# Patient Record
Sex: MALE | Race: WHITE | Employment: OTHER | URBAN - METROPOLITAN AREA
[De-identification: names, ages, dates, MRNs, and addresses within clinical notes are randomized per-mention and may not be internally consistent; named-entity substitution may affect disease eponyms.]

---

## 2022-02-16 ENCOUNTER — TRANSCRIBE ORDER (OUTPATIENT)
Dept: SCHEDULING | Age: 72
End: 2022-02-16

## 2022-02-16 DIAGNOSIS — M85.89 OSTEOPENIA OF MULTIPLE SITES: Primary | ICD-10-CM

## 2022-04-12 ENCOUNTER — HOSPITAL ENCOUNTER (OUTPATIENT)
Dept: MAMMOGRAPHY | Age: 72
Discharge: HOME | End: 2022-04-12
Attending: INTERNAL MEDICINE
Payer: MEDICARE

## 2022-04-12 DIAGNOSIS — M85.89 OSTEOPENIA OF MULTIPLE SITES: ICD-10-CM

## 2022-04-12 PROCEDURE — 77080 DXA BONE DENSITY AXIAL: CPT

## 2022-05-13 ENCOUNTER — OFFICE VISIT (OUTPATIENT)
Dept: SURGERY | Age: 72
End: 2022-05-13
Payer: MEDICARE

## 2022-05-13 VITALS
TEMPERATURE: 97.6 F | BODY MASS INDEX: 26.18 KG/M2 | HEIGHT: 74 IN | DIASTOLIC BLOOD PRESSURE: 74 MMHG | HEART RATE: 60 BPM | RESPIRATION RATE: 17 BRPM | SYSTOLIC BLOOD PRESSURE: 133 MMHG | WEIGHT: 204 LBS | OXYGEN SATURATION: 99 %

## 2022-05-13 DIAGNOSIS — I47.1 SVT (SUPRAVENTRICULAR TACHYCARDIA) (HCC): ICD-10-CM

## 2022-05-13 DIAGNOSIS — Z01.810 PREOP CARDIOVASCULAR EXAM: Primary | ICD-10-CM

## 2022-05-13 DIAGNOSIS — K40.90 REDUCIBLE RIGHT INGUINAL HERNIA: ICD-10-CM

## 2022-05-13 PROCEDURE — 3017F COLORECTAL CA SCREEN DOC REV: CPT | Performed by: SURGERY

## 2022-05-13 PROCEDURE — G8536 NO DOC ELDER MAL SCRN: HCPCS | Performed by: SURGERY

## 2022-05-13 PROCEDURE — G8432 DEP SCR NOT DOC, RNG: HCPCS | Performed by: SURGERY

## 2022-05-13 PROCEDURE — G8427 DOCREV CUR MEDS BY ELIG CLIN: HCPCS | Performed by: SURGERY

## 2022-05-13 PROCEDURE — 99204 OFFICE O/P NEW MOD 45 MIN: CPT | Performed by: SURGERY

## 2022-05-13 PROCEDURE — G8419 CALC BMI OUT NRM PARAM NOF/U: HCPCS | Performed by: SURGERY

## 2022-05-13 PROCEDURE — 1101F PT FALLS ASSESS-DOCD LE1/YR: CPT | Performed by: SURGERY

## 2022-05-13 RX ORDER — ASPIRIN 81 MG/1
162 TABLET ORAL DAILY
COMMUNITY

## 2022-05-13 RX ORDER — ROSUVASTATIN CALCIUM 10 MG/1
10 TABLET, COATED ORAL DAILY
COMMUNITY
Start: 2022-02-14

## 2022-05-13 RX ORDER — ALENDRONATE SODIUM 40 MG/1
TABLET ORAL
COMMUNITY

## 2022-05-13 RX ORDER — OMEPRAZOLE 20 MG/1
20 CAPSULE, DELAYED RELEASE ORAL AS NEEDED
COMMUNITY

## 2022-05-13 RX ORDER — CHOLECALCIFEROL (VITAMIN D3) 125 MCG
4000 CAPSULE ORAL DAILY
COMMUNITY

## 2022-05-13 NOTE — LETTER
5/13/2022    Patient: Yulia Alfred   YOB: 1950   Date of Visit: 5/13/2022     Nick Staton MD  [address] [address]  Via Fax: [phone]    Dear Nick Staton MD,      Thank you for referring Mr. Yulia Alfred to [address] for evaluation. My notes for this consultation are attached. If you have questions, please do not hesitate to call me. I look forward to following your patient along with you.       Sincerely,    Ayesha Saunders, [address] [place] [address]

## 2022-05-13 NOTE — PROGRESS NOTES
CC:   Chief Complaint   Patient presents with    Inguinal Hernia        Assessment:  1. Preop cardiovascular exam    - SCHEDULE SURGERY  - EKG, 12 LEAD, INITIAL; Future  - CBC WITH AUTOMATED DIFF; Future  - METABOLIC PANEL, BASIC; Future    2. Reducible right inguinal hernia      3. SVT (supraventricular tachycardia) (HCC)         Plan: He has a large symptomatic reducible right inguinal hernia that he would like to repair at this time. Recommended open right inguinal hernia repair with mesh. The risks and benefits of the procedure were reviewed with the patient including infection, bleeding, need for repeat procedure, injury to surrounding structures, chronic pain and recurrent hernia. Post operative instructions and expectations were reviewed with the patient. The patient's questions were answered and consent was obtained. We will obtain preoperative EKG and routine labs. He states he was recently seen by his primary care who follows him for a history of his heart and states everything was good. We will need to obtain a clearance letter from his medical doctor. He agrees with this plan. HPI:  Cruz Cody is a 73 y.o. male who is referred by Veronica Woodson MD for right inguinal hernia. States that he has noticed the hernia for several months now and feels the area is getting larger. The hernia does pop out and will reduce back in with pressure. He will occasionally get a pinching sensation or retraction of the right testicle when the hernia comes out. No difficulty urinating or using his bowels. He does not do any heavy lifting. He does have a history of left open inguinal hernia repair in 2018 and reports no problems from that surgery that was performed in [place]. He does have a history of cardiac stents in 2009 and has had no cardiac issues since then.   He states he does have a long standing history of SVT and will have a brief episode of dizziness that will resolve with in 10 seconds after doing a maneuver such as cough or laying down. Allergies: Allergies   Allergen Reactions    Demerol [Meperidine] Nausea Only       Medication Review:  Current Outpatient Medications on File Prior to Visit   Medication Sig Dispense Refill    aspirin delayed-release 81 mg tablet Adult Low Dose Aspirin 81 mg tablet,delayed release   Take 2 tablets every day by oral route.  omeprazole (PRILOSEC) 20 mg capsule Take 20 mg by mouth daily.  rosuvastatin (CRESTOR) 10 mg tablet Take 10 mg by mouth daily.  cholecalciferol, vitamin D3, (Vitamin D3) 50 mcg (2,000 unit) tab Take 4,000 Units by mouth daily.  ubidecarenone (COQ-10 PO) Take  by mouth daily.  alendronate (FOSAMAX) 40 mg tablet Take  by mouth every seven (7) days. No current facility-administered medications on file prior to visit. Systems Review:  Review of Systems   Constitutional: Negative for activity change, fatigue and fever. Respiratory: Negative for chest tightness and shortness of breath. Cardiovascular: Negative for chest pain and palpitations. Gastrointestinal: Negative for abdominal distention and constipation. Genitourinary: Negative for scrotal swelling, testicular pain and urgency. Skin: Negative for pallor and rash. PMH:  Past Medical History:   Diagnosis Date    Arthritis     Inguinal hernia     right    Osteoporosis     Rheumatoid arthritis ([address]     in remission for 2 years    Shoulder pain, right        Surgical History:  Past Surgical History:   Procedure Laterality Date    HX HERNIA REPAIR Left 2018    [facility] CARD[facility] PROCEDURE UNLIST  2009    stents placed       Social History:  Social History     Socioeconomic History    Marital status:    Tobacco Use    Smoking status: Never Smoker    Smokeless tobacco: Never Used   Substance and Sexual Activity    Alcohol use: Yes    Drug use: Never       Family History:  History reviewed. No pertinent family history.     No results found for any previous visit. DEXA BONE DENSITY STUDY AXIAL  Narrative: DXA BONE DENSITOMETRY, CENTRAL    INDICATION:  Postmenopausal, screening. COMPARISON: None    TECHNIQUE: Using GE LUNAR Prodigy densitometer, bone density measurement was  performed in the lumbar spine in addition to the proximal left and right femurs. T-score refers to standard deviations above or below average compared to a  young adult of the same sex. Z-score refers to standard deviations above or  below average compared to a patient of the same sex, age, race and weight. VALIDITY:    Valid at the spine and left hip. RESULTS:    The bone mineral density of the L1-L4 vertebral bodies is 1.177 g/cm2. This  corresponds to a T-score of 3.1. The bone mineral density of the left femoral neck is 0.921 g/cm2. This  corresponds to a T-score of -0.8. FRAX CALCULATION 10-YEAR PROBABILITY OF FRACTURE:    Major osteoporotic compression fracture: 12.0 %  Hip fracture: 2.2 %    The [address] [place] [address] has recommended that treatment be  considered for those with a 10-year probability of:    Major osteoporotic compression fracture: >20%  Hip fracture: >3%  Patient preferences may indicate treatment for people with 10-year fracture  probabilities above or below these levels. Impression: Bone mineral density measures within normal limits. COMMENTS:  Based upon current ISCD guidelines, the patient's overall diagnostic criteria,  selected using WHO criteria in postmenopausal women and males aged 48 and above,  is selected based upon the lowest T-score from among the lumbar spine, femoral  neck or 33% radius (if measured).     WHO Definition of Osteoporosis and Osteopenia on DXA (specified for  post-menopausal  females):      Normal:                     T-Score at or above -1 SD    Osteopenia:              T-Score between -1 and -2.5 SD    Osteoporosis:           T-Score at or below -2.5 SD    The risk of fracture approximately doubles for each 1 SD decrease in T-score. It is important to consider other factors in assessing a patient's risk of  fracture, including age, risk of falling/injury, history of fragility fracture,  family history of osteoporosis, smoking, low weight. RECOMMENDATIONS:  National Osteoporosis Foundation (NOF) guidelines recommend initiating therapy  to reduce fracture risk in women with BMD: T-score below -2.5 SD or T-score  below -1.5 SD with other risk factors present, specifically verbal fracture  (clinical or asymptomatic) or hip fracture. * Mild to aggressive therapies are available in the form of hormone replacement  therapy (HRT), bisphosphonates, Calcitonin, and SERMs. Additionally, all  patients should insure an adequate intake of dietary calcium (1200 mg/d) and  vitamin D (400-800 IU daily). * People with diagnosed cases of osteoporosis or osteopenia should be regularly  tested for bone mineral density. For patient's eligible for Medicare, routine  testing is allowed once every 2 years. The testing frequently can be increased  to one year for patients who have rapidly progressing disease, or for those who  are receiving medical therapy to restore bone mass. Physical Exam:  Visit Vitals  /74   Pulse 60   Temp 97.6 °F (36.4 °C) (Temporal)   Resp 17   Ht 6' 1.5\" (1.867 m)   Wt 92.5 kg (204 lb)   SpO2 99%   BMI 26.55 kg/m²    BMI: Body mass index is 26.55 kg/m². Physical Exam  Constitutional:       Appearance: Normal appearance. He is normal weight. HENT:      Head: Normocephalic and atraumatic. Eyes:      Extraocular Movements: Extraocular movements intact. Conjunctiva/sclera: Conjunctivae normal.      Pupils: Pupils are equal, round, and reactive to light. Cardiovascular:      Rate and Rhythm: Normal rate and regular rhythm. Pulmonary:      Effort: Pulmonary effort is normal.      Breath sounds: Normal breath sounds.    Abdominal: General: Abdomen is flat. Bowel sounds are normal.      Palpations: Abdomen is soft. Hernia: A hernia is present. Comments: Large reducible right inguinal hernia, non tender, normal testicle, left inguinal incision well healed   Skin:     General: Skin is warm and dry. Neurological:      General: No focal deficit present. Mental Status: He is alert and oriented to person, place, and time. Psychiatric:         Mood and Affect: Mood normal.         Behavior: Behavior normal.         Thought Content: Thought content normal.         Judgment: Judgment normal.         I have reviewed the information entered by the clinical staff and/or patient and verified it as accurate or edited where necessary.      Electronically signed by:    Skyler Anderson DO, MPH

## 2022-05-13 NOTE — H&P (VIEW-ONLY)
CC:   Chief Complaint   Patient presents with    Inguinal Hernia        Assessment:  1. Preop cardiovascular exam    - SCHEDULE SURGERY  - EKG, 12 LEAD, INITIAL; Future  - CBC WITH AUTOMATED DIFF; Future  - METABOLIC PANEL, BASIC; Future    2. Reducible right inguinal hernia      3. SVT (supraventricular tachycardia) (HCC)         Plan: He has a large symptomatic reducible right inguinal hernia that he would like to repair at this time. Recommended open right inguinal hernia repair with mesh. The risks and benefits of the procedure were reviewed with the patient including infection, bleeding, need for repeat procedure, injury to surrounding structures, chronic pain and recurrent hernia. Post operative instructions and expectations were reviewed with the patient. The patient's questions were answered and consent was obtained. We will obtain preoperative EKG and routine labs. He states he was recently seen by his primary care who follows him for a history of his heart and states everything was good. We will need to obtain a clearance letter from his medical doctor. He agrees with this plan. HPI:  Ann Marie Wharton is a 73 y.o. male who is referred by Sheryl Lucero MD for right inguinal hernia. States that he has noticed the hernia for several months now and feels the area is getting larger. The hernia does pop out and will reduce back in with pressure. He will occasionally get a pinching sensation or retraction of the right testicle when the hernia comes out. No difficulty urinating or using his bowels. He does not do any heavy lifting. He does have a history of left open inguinal hernia repair in 2018 and reports no problems from that surgery that was performed in [place]. He does have a history of cardiac stents in 2009 and has had no cardiac issues since then.   He states he does have a long standing history of SVT and will have a brief episode of dizziness that will resolve with in 10 seconds after doing a maneuver such as cough or laying down. Allergies: Allergies   Allergen Reactions    Demerol [Meperidine] Nausea Only       Medication Review:  Current Outpatient Medications on File Prior to Visit   Medication Sig Dispense Refill    aspirin delayed-release 81 mg tablet Adult Low Dose Aspirin 81 mg tablet,delayed release   Take 2 tablets every day by oral route.  omeprazole (PRILOSEC) 20 mg capsule Take 20 mg by mouth daily.  rosuvastatin (CRESTOR) 10 mg tablet Take 10 mg by mouth daily.  cholecalciferol, vitamin D3, (Vitamin D3) 50 mcg (2,000 unit) tab Take 4,000 Units by mouth daily.  ubidecarenone (COQ-10 PO) Take  by mouth daily.  alendronate (FOSAMAX) 40 mg tablet Take  by mouth every seven (7) days. No current facility-administered medications on file prior to visit. Systems Review:  Review of Systems   Constitutional: Negative for activity change, fatigue and fever. Respiratory: Negative for chest tightness and shortness of breath. Cardiovascular: Negative for chest pain and palpitations. Gastrointestinal: Negative for abdominal distention and constipation. Genitourinary: Negative for scrotal swelling, testicular pain and urgency. Skin: Negative for pallor and rash. PMH:  Past Medical History:   Diagnosis Date    Arthritis     Inguinal hernia     right    Osteoporosis     Rheumatoid arthritis ([address]     in remission for 2 years    Shoulder pain, right        Surgical History:  Past Surgical History:   Procedure Laterality Date    HX HERNIA REPAIR Left 2018    [facility] CARDIAC SURG PROCEDURE UNLIST  2009    stents placed       Social History:  Social History     Socioeconomic History    Marital status:    Tobacco Use    Smoking status: Never Smoker    Smokeless tobacco: Never Used   Substance and Sexual Activity    Alcohol use: Yes    Drug use: Never       Family History:  History reviewed. No pertinent family history.     No results found for any previous visit. DEXA BONE DENSITY STUDY AXIAL  Narrative: DXA BONE DENSITOMETRY, CENTRAL    INDICATION:  Postmenopausal, screening. COMPARISON: None    TECHNIQUE: Using GE LUNAR Prodigy densitometer, bone density measurement was  performed in the lumbar spine in addition to the proximal left and right femurs. T-score refers to standard deviations above or below average compared to a  young adult of the same sex. Z-score refers to standard deviations above or  below average compared to a patient of the same sex, age, race and weight. VALIDITY:    Valid at the spine and left hip. RESULTS:    The bone mineral density of the L1-L4 vertebral bodies is 1.177 g/cm2. This  corresponds to a T-score of 3.1. The bone mineral density of the left femoral neck is 0.921 g/cm2. This  corresponds to a T-score of -0.8. FRAX CALCULATION 10-YEAR PROBABILITY OF FRACTURE:    Major osteoporotic compression fracture: 12.0 %  Hip fracture: 2.2 %    The [address] [place] [address] has recommended that treatment be  considered for those with a 10-year probability of:    Major osteoporotic compression fracture: >20%  Hip fracture: >3%  Patient preferences may indicate treatment for people with 10-year fracture  probabilities above or below these levels. Impression: Bone mineral density measures within normal limits. COMMENTS:  Based upon current ISCD guidelines, the patient's overall diagnostic criteria,  selected using WHO criteria in postmenopausal women and males aged 48 and above,  is selected based upon the lowest T-score from among the lumbar spine, femoral  neck or 33% radius (if measured).     WHO Definition of Osteoporosis and Osteopenia on DXA (specified for  post-menopausal  females):      Normal:                     T-Score at or above -1 SD    Osteopenia:              T-Score between -1 and -2.5 SD    Osteoporosis:           T-Score at or below -2.5 SD    The risk of fracture approximately doubles for each 1 SD decrease in T-score. It is important to consider other factors in assessing a patient's risk of  fracture, including age, risk of falling/injury, history of fragility fracture,  family history of osteoporosis, smoking, low weight. RECOMMENDATIONS:  National Osteoporosis Foundation (NOF) guidelines recommend initiating therapy  to reduce fracture risk in women with BMD: T-score below -2.5 SD or T-score  below -1.5 SD with other risk factors present, specifically verbal fracture  (clinical or asymptomatic) or hip fracture. * Mild to aggressive therapies are available in the form of hormone replacement  therapy (HRT), bisphosphonates, Calcitonin, and SERMs. Additionally, all  patients should insure an adequate intake of dietary calcium (1200 mg/d) and  vitamin D (400-800 IU daily). * People with diagnosed cases of osteoporosis or osteopenia should be regularly  tested for bone mineral density. For patient's eligible for Medicare, routine  testing is allowed once every 2 years. The testing frequently can be increased  to one year for patients who have rapidly progressing disease, or for those who  are receiving medical therapy to restore bone mass. Physical Exam:  Visit Vitals  /74   Pulse 60   Temp 97.6 °F (36.4 °C) (Temporal)   Resp 17   Ht 6' 1.5\" (1.867 m)   Wt 92.5 kg (204 lb)   SpO2 99%   BMI 26.55 kg/m²    BMI: Body mass index is 26.55 kg/m². Physical Exam  Constitutional:       Appearance: Normal appearance. He is normal weight. HENT:      Head: Normocephalic and atraumatic. Eyes:      Extraocular Movements: Extraocular movements intact. Conjunctiva/sclera: Conjunctivae normal.      Pupils: Pupils are equal, round, and reactive to light. Cardiovascular:      Rate and Rhythm: Normal rate and regular rhythm. Pulmonary:      Effort: Pulmonary effort is normal.      Breath sounds: Normal breath sounds.    Abdominal: General: Abdomen is flat. Bowel sounds are normal.      Palpations: Abdomen is soft. Hernia: A hernia is present. Comments: Large reducible right inguinal hernia, non tender, normal testicle, left inguinal incision well healed   Skin:     General: Skin is warm and dry. Neurological:      General: No focal deficit present. Mental Status: He is alert and oriented to person, place, and time. Psychiatric:         Mood and Affect: Mood normal.         Behavior: Behavior normal.         Thought Content: Thought content normal.         Judgment: Judgment normal.         I have reviewed the information entered by the clinical staff and/or patient and verified it as accurate or edited where necessary.      Electronically signed by:    Zulema Natarajan DO, MPH

## 2022-05-18 ENCOUNTER — HOSPITAL ENCOUNTER (OUTPATIENT)
Dept: LAB | Age: 72
Discharge: HOME | End: 2022-05-18
Payer: MEDICARE

## 2022-05-18 ENCOUNTER — HOSPITAL ENCOUNTER (OUTPATIENT)
Dept: NON INVASIVE DIAGNOSTICS | Age: 72
Discharge: HOME | End: 2022-05-18
Payer: MEDICARE

## 2022-05-18 DIAGNOSIS — Z01.810 PREOP CARDIOVASCULAR EXAM: ICD-10-CM

## 2022-05-18 LAB
ANION GAP SERPL CALC-SCNC: 4 MMOL/L (ref 3–18)
ATRIAL RATE: 55 BPM
BASOPHILS # BLD: 0 K/UL (ref 0–0.1)
BASOPHILS NFR BLD: 0 % (ref 0–2)
BUN SERPL-MCNC: 32 MG/DL (ref 7–18)
BUN/CREAT SERPL: 27 (ref 12–20)
CA-I BLD-MCNC: 9.3 MG/DL (ref 8.5–10.1)
CALCULATED P AXIS, ECG09: 59 DEGREES
CALCULATED R AXIS, ECG10: 76 DEGREES
CALCULATED T AXIS, ECG11: 53 DEGREES
CHLORIDE SERPL-SCNC: 103 MMOL/L (ref 100–111)
CO2 SERPL-SCNC: 31 MMOL/L (ref 21–32)
CREAT SERPL-MCNC: 1.19 MG/DL (ref 0.6–1.3)
DIAGNOSIS, 93000: NORMAL
DIFFERENTIAL METHOD BLD: NORMAL
EOSINOPHIL # BLD: 0.3 K/UL (ref 0–0.4)
EOSINOPHIL NFR BLD: 5 % (ref 0–5)
ERYTHROCYTE [DISTWIDTH] IN BLOOD BY AUTOMATED COUNT: 13 % (ref 11.6–14.5)
GLUCOSE SERPL-MCNC: 102 MG/DL (ref 74–99)
HCT VFR BLD AUTO: 44.7 % (ref 36–48)
HGB BLD-MCNC: 14.9 G/DL (ref 13–16)
IMM GRANULOCYTES # BLD AUTO: 0 K/UL (ref 0–0.04)
IMM GRANULOCYTES NFR BLD AUTO: 0 % (ref 0–0.5)
LYMPHOCYTES # BLD: 1.7 K/UL (ref 0.9–3.6)
LYMPHOCYTES NFR BLD: 25 % (ref 21–52)
MCH RBC QN AUTO: 31.2 PG (ref 24–34)
MCHC RBC AUTO-ENTMCNC: 33.3 G/DL (ref 31–37)
MCV RBC AUTO: 93.7 FL (ref 78–100)
MONOCYTES # BLD: 0.5 K/UL (ref 0.05–1.2)
MONOCYTES NFR BLD: 7 % (ref 3–10)
NEUTS SEG # BLD: 4.2 K/UL (ref 1.8–8)
NEUTS SEG NFR BLD: 63 % (ref 40–73)
NRBC # BLD: 0 K/UL (ref 0–0.01)
NRBC BLD-RTO: 0 PER 100 WBC
P-R INTERVAL, ECG05: 154 MS
PLATELET # BLD AUTO: 192 K/UL (ref 135–420)
PMV BLD AUTO: 10.6 FL (ref 9.2–11.8)
POTASSIUM SERPL-SCNC: 4 MMOL/L (ref 3.5–5.5)
Q-T INTERVAL, ECG07: 444 MS
QRS DURATION, ECG06: 88 MS
QTC CALCULATION (BEZET), ECG08: 424 MS
RBC # BLD AUTO: 4.77 M/UL (ref 4.35–5.65)
SODIUM SERPL-SCNC: 138 MMOL/L (ref 136–145)
VENTRICULAR RATE, ECG03: 55 BPM
WBC # BLD AUTO: 6.7 K/UL (ref 4.6–13.2)

## 2022-05-18 PROCEDURE — 36415 COLL VENOUS BLD VENIPUNCTURE: CPT

## 2022-05-18 PROCEDURE — 93005 ELECTROCARDIOGRAM TRACING: CPT

## 2022-05-18 PROCEDURE — 85025 COMPLETE CBC W/AUTO DIFF WBC: CPT

## 2022-05-18 PROCEDURE — 80048 BASIC METABOLIC PNL TOTAL CA: CPT

## 2022-06-02 NOTE — PERIOP NOTES
PRE-SURGICAL INSTRUCTIONS        Patient's Name:  Marcio Beckwith      [id number] Date:  6/2/2022            Covid Testing Date and Time:    Surgery Date:  6/7/2022                1. Do NOT eat or drink anything, including candy, gum, or ice chips after midnight on 06/06/2022, unless you have specific instructions from your surgeon or anesthesia provider to do so.  2. You may brush your teeth before coming to the hospital.  3. No smoking 24 hours prior to the day of surgery. 4. No alcohol 24 hours prior to the day of surgery. 5. No recreational drugs for one week prior to the day of surgery. 6. Leave all valuables, including money/purse, at home. 7. Remove all jewelry, nail polish, acrylic nails, and makeup (including mascara); no lotions powders, deodorant, or perfume/cologne/after shave on the skin. 8. Follow instruction for Hibiclens washes and CHG wipes from surgeon's office. 9. Glasses/contact lenses and dentures may be worn to the hospital.  They will be removed prior to surgery. 10. Call your doctor if symptoms of a cold or illness develop within 24-48 hours prior to your surgery. 11.  If you are having an outpatient procedure, please make arrangements for a responsible ADULT TO [address] [place] [address] and stay with you for 24 hours after your surgery. 12. ONE VISITOR in the hospital at this time for outpatient procedures. Exceptions may be made for surgical admissions, per nursing unit guidelines      Special Instructions:      Bring list of CURRENT medications. Bring any pertinent legal medical records. Follow physician instructions about stopping anticoagulants. Complete bowel prep per MD instructions. On the day of surgery, come in the main entrance of [facility]". Let the  at the desk know you are there for surgery. A staff member will come escort you to the surgical area on the second floor.     If you have any questions or concerns, please do not hesitate to call:     (Prior to the day of surgery) Merged with [facility] department:  [phone]   (Day of surgery) Pre-Op department:  [phone]    These surgical instructions were reviewed with patient during the PAT phone call.

## 2022-06-06 ENCOUNTER — ANESTHESIA EVENT (OUTPATIENT)
Dept: SURGERY | Age: 72
End: 2022-06-06
Payer: MEDICARE

## 2022-06-07 ENCOUNTER — HOSPITAL ENCOUNTER (OUTPATIENT)
Age: 72
Setting detail: OUTPATIENT SURGERY
Discharge: HOME | End: 2022-06-07
Attending: SURGERY | Admitting: SURGERY
Payer: MEDICARE

## 2022-06-07 ENCOUNTER — ANESTHESIA (OUTPATIENT)
Dept: SURGERY | Age: 72
End: 2022-06-07
Payer: MEDICARE

## 2022-06-07 VITALS
DIASTOLIC BLOOD PRESSURE: 73 MMHG | BODY MASS INDEX: 26.64 KG/M2 | HEIGHT: 73 IN | OXYGEN SATURATION: 98 % | HEART RATE: 55 BPM | TEMPERATURE: 97 F | SYSTOLIC BLOOD PRESSURE: 134 MMHG | RESPIRATION RATE: 16 BRPM | WEIGHT: 201 LBS

## 2022-06-07 DIAGNOSIS — K40.90 RIGHT INGUINAL HERNIA: Primary | ICD-10-CM

## 2022-06-07 PROCEDURE — 77030002933 HC SUT MCRYL J&J -A: Performed by: SURGERY

## 2022-06-07 PROCEDURE — 77030040361 HC SLV COMPR DVT MDII -B: Performed by: SURGERY

## 2022-06-07 PROCEDURE — 77030012422 HC DRN WND COVD -A: Performed by: SURGERY

## 2022-06-07 PROCEDURE — 74011250636 HC RX REV CODE- 250/636: Performed by: SURGERY

## 2022-06-07 PROCEDURE — 2709999900 HC NON-CHARGEABLE SUPPLY: Performed by: SURGERY

## 2022-06-07 PROCEDURE — 74011250637 HC RX REV CODE- 250/637: Performed by: NURSE ANESTHETIST, CERTIFIED REGISTERED

## 2022-06-07 PROCEDURE — 77030002966 HC SUT PDS J&J -A: Performed by: SURGERY

## 2022-06-07 PROCEDURE — 77030010507 HC ADH SKN DERMBND J&J -B: Performed by: SURGERY

## 2022-06-07 PROCEDURE — 74011000250 HC RX REV CODE- 250: Performed by: SURGERY

## 2022-06-07 PROCEDURE — 76210000016 HC OR PH I REC 1 TO 1.5 HR: Performed by: SURGERY

## 2022-06-07 PROCEDURE — 76060000033 HC ANESTHESIA 1 TO 1.5 HR: Performed by: SURGERY

## 2022-06-07 PROCEDURE — 74011250636 HC RX REV CODE- 250/636: Performed by: NURSE ANESTHETIST, CERTIFIED REGISTERED

## 2022-06-07 PROCEDURE — C1781 MESH (IMPLANTABLE): HCPCS | Performed by: SURGERY

## 2022-06-07 PROCEDURE — 74011000250 HC RX REV CODE- 250: Performed by: NURSE ANESTHETIST, CERTIFIED REGISTERED

## 2022-06-07 PROCEDURE — 77030002986 HC SUT PROL J&J -A: Performed by: SURGERY

## 2022-06-07 PROCEDURE — 76210000021 HC REC RM PH II 0.5 TO 1 HR: Performed by: SURGERY

## 2022-06-07 PROCEDURE — 77030031139 HC SUT VCRL2 J&J -A: Performed by: SURGERY

## 2022-06-07 PROCEDURE — 00830 ANES HERNIA RPR LWR ABD NOS: CPT | Performed by: ANESTHESIOLOGY

## 2022-06-07 PROCEDURE — 77030040922 HC BLNKT HYPOTHRM STRY -A: Performed by: SURGERY

## 2022-06-07 PROCEDURE — 77030018548 HC SUT ETHBND2 J&J -B: Performed by: SURGERY

## 2022-06-07 PROCEDURE — 76010000149 HC OR TIME 1 TO 1.5 HR: Performed by: SURGERY

## 2022-06-07 PROCEDURE — 77030032020 HC SUPP SCROT 3M -A: Performed by: SURGERY

## 2022-06-07 PROCEDURE — 00830 ANES HERNIA RPR LWR ABD NOS: CPT | Performed by: NURSE ANESTHETIST, CERTIFIED REGISTERED

## 2022-06-07 PROCEDURE — 77030018836 HC SOL IRR NACL ICUM -A: Performed by: SURGERY

## 2022-06-07 DEVICE — MESH SURG W3.5XL6IN POLY SELF FIXATING RECT W/ RESRB PLA: Type: IMPLANTABLE DEVICE | Site: GROIN | Status: FUNCTIONAL

## 2022-06-07 RX ORDER — FENTANYL CITRATE 50 UG/ML
INJECTION, SOLUTION INTRAMUSCULAR; INTRAVENOUS AS NEEDED
Status: DISCONTINUED | OUTPATIENT
Start: 2022-06-07 | End: 2022-06-07 | Stop reason: HOSPADM

## 2022-06-07 RX ORDER — DEXAMETHASONE SODIUM PHOSPHATE 4 MG/ML
INJECTION, SOLUTION INTRA-ARTICULAR; INTRALESIONAL; INTRAMUSCULAR; INTRAVENOUS; SOFT TISSUE AS NEEDED
Status: DISCONTINUED | OUTPATIENT
Start: 2022-06-07 | End: 2022-06-07 | Stop reason: HOSPADM

## 2022-06-07 RX ORDER — FAMOTIDINE 20 MG/1
20 TABLET, FILM COATED ORAL ONCE
Status: COMPLETED | OUTPATIENT
Start: 2022-06-07 | End: 2022-06-07

## 2022-06-07 RX ORDER — SUCCINYLCHOLINE CHLORIDE 20 MG/ML
INJECTION INTRAMUSCULAR; INTRAVENOUS AS NEEDED
Status: DISCONTINUED | OUTPATIENT
Start: 2022-06-07 | End: 2022-06-07 | Stop reason: HOSPADM

## 2022-06-07 RX ORDER — ONDANSETRON 2 MG/ML
INJECTION INTRAMUSCULAR; INTRAVENOUS AS NEEDED
Status: DISCONTINUED | OUTPATIENT
Start: 2022-06-07 | End: 2022-06-07 | Stop reason: HOSPADM

## 2022-06-07 RX ORDER — EPHEDRINE SULFATE/0.9% NACL/PF 25 MG/5 ML
SYRINGE (ML) INTRAVENOUS AS NEEDED
Status: DISCONTINUED | OUTPATIENT
Start: 2022-06-07 | End: 2022-06-07 | Stop reason: HOSPADM

## 2022-06-07 RX ORDER — SODIUM CHLORIDE 0.9 % (FLUSH) 0.9 %
5-40 SYRINGE (ML) INJECTION AS NEEDED
Status: DISCONTINUED | OUTPATIENT
Start: 2022-06-07 | End: 2022-06-07 | Stop reason: HOSPADM

## 2022-06-07 RX ORDER — LIDOCAINE HYDROCHLORIDE 10 MG/ML
0.1 INJECTION, SOLUTION EPIDURAL; INFILTRATION; INTRACAUDAL; PERINEURAL AS NEEDED
Status: DISCONTINUED | OUTPATIENT
Start: 2022-06-07 | End: 2022-06-07 | Stop reason: HOSPADM

## 2022-06-07 RX ORDER — HYDROCODONE BITARTRATE AND ACETAMINOPHEN 5; 325 MG/1; MG/1
1 TABLET ORAL
Qty: 20 TABLET | Refills: 0 | Status: SHIPPED | OUTPATIENT
Start: 2022-06-07 | End: 2022-06-12

## 2022-06-07 RX ORDER — ROCURONIUM BROMIDE 10 MG/ML
INJECTION, SOLUTION INTRAVENOUS AS NEEDED
Status: DISCONTINUED | OUTPATIENT
Start: 2022-06-07 | End: 2022-06-07 | Stop reason: HOSPADM

## 2022-06-07 RX ORDER — SODIUM CHLORIDE, SODIUM LACTATE, POTASSIUM CHLORIDE, CALCIUM CHLORIDE 600; 310; 30; 20 MG/100ML; MG/100ML; MG/100ML; MG/100ML
50 INJECTION, SOLUTION INTRAVENOUS CONTINUOUS
Status: DISCONTINUED | OUTPATIENT
Start: 2022-06-07 | End: 2022-06-07 | Stop reason: HOSPADM

## 2022-06-07 RX ORDER — LIDOCAINE HYDROCHLORIDE 20 MG/ML
INJECTION, SOLUTION EPIDURAL; INFILTRATION; INTRACAUDAL; PERINEURAL AS NEEDED
Status: DISCONTINUED | OUTPATIENT
Start: 2022-06-07 | End: 2022-06-07 | Stop reason: HOSPADM

## 2022-06-07 RX ORDER — PROPOFOL 10 MG/ML
INJECTION, EMULSION INTRAVENOUS AS NEEDED
Status: DISCONTINUED | OUTPATIENT
Start: 2022-06-07 | End: 2022-06-07 | Stop reason: HOSPADM

## 2022-06-07 RX ORDER — SODIUM CHLORIDE 0.9 % (FLUSH) 0.9 %
5-40 SYRINGE (ML) INJECTION EVERY 8 HOURS
Status: DISCONTINUED | OUTPATIENT
Start: 2022-06-07 | End: 2022-06-07 | Stop reason: HOSPADM

## 2022-06-07 RX ADMIN — PROPOFOL 50 MG: 10 INJECTION, EMULSION INTRAVENOUS at 10:36

## 2022-06-07 RX ADMIN — LIDOCAINE HYDROCHLORIDE 60 MG: 20 INJECTION, SOLUTION EPIDURAL; INFILTRATION; INTRACAUDAL; PERINEURAL at 10:14

## 2022-06-07 RX ADMIN — DEXAMETHASONE SODIUM PHOSPHATE 8 MG: 4 INJECTION, SOLUTION INTRAMUSCULAR; INTRAVENOUS at 10:30

## 2022-06-07 RX ADMIN — SUGAMMADEX 200 MG: 100 INJECTION, SOLUTION INTRAVENOUS at 11:31

## 2022-06-07 RX ADMIN — ONDANSETRON 4 MG: 2 INJECTION INTRAMUSCULAR; INTRAVENOUS at 11:15

## 2022-06-07 RX ADMIN — FENTANYL CITRATE 50 MCG: 50 INJECTION, SOLUTION INTRAMUSCULAR; INTRAVENOUS at 10:18

## 2022-06-07 RX ADMIN — LIDOCAINE HYDROCHLORIDE 40 MG: 20 INJECTION, SOLUTION EPIDURAL; INFILTRATION; INTRACAUDAL; PERINEURAL at 10:16

## 2022-06-07 RX ADMIN — ROCURONIUM BROMIDE 20 MG: 50 INJECTION INTRAVENOUS at 10:54

## 2022-06-07 RX ADMIN — FENTANYL CITRATE 25 MCG: 50 INJECTION, SOLUTION INTRAMUSCULAR; INTRAVENOUS at 10:10

## 2022-06-07 RX ADMIN — SUCCINYLCHOLINE CHLORIDE 160 MG: 20 INJECTION, SOLUTION INTRAMUSCULAR; INTRAVENOUS at 10:19

## 2022-06-07 RX ADMIN — SODIUM CHLORIDE, POTASSIUM CHLORIDE, SODIUM LACTATE AND CALCIUM CHLORIDE 50 ML/HR: 600; 310; 30; 20 INJECTION, SOLUTION INTRAVENOUS at 09:53

## 2022-06-07 RX ADMIN — FENTANYL CITRATE 25 MCG: 50 INJECTION, SOLUTION INTRAMUSCULAR; INTRAVENOUS at 10:25

## 2022-06-07 RX ADMIN — PROPOFOL 150 MG: 10 INJECTION, EMULSION INTRAVENOUS at 10:14

## 2022-06-07 RX ADMIN — PROPOFOL 50 MG: 10 INJECTION, EMULSION INTRAVENOUS at 10:54

## 2022-06-07 RX ADMIN — CEFAZOLIN SODIUM 2 G: 1 INJECTION, POWDER, FOR SOLUTION INTRAMUSCULAR; INTRAVENOUS at 10:25

## 2022-06-07 RX ADMIN — FAMOTIDINE 20 MG: 20 TABLET ORAL at 09:53

## 2022-06-07 RX ADMIN — Medication 10 MG: at 10:48

## 2022-06-07 RX ADMIN — PROPOFOL 50 MG: 10 INJECTION, EMULSION INTRAVENOUS at 10:16

## 2022-06-07 RX ADMIN — Medication 10 MG: at 11:07

## 2022-06-07 NOTE — ANESTHESIA POSTPROCEDURE EVALUATION
Procedure(s):  OPEN RIGHT INGUINAL HERNIA REPAIR WITH MESH. general    Anesthesia Post Evaluation      Multimodal analgesia: multimodal analgesia used between 6 hours prior to anesthesia start to PACU discharge  Patient location during evaluation: PACU  Patient participation: complete - patient participated  Level of consciousness: awake and alert  Pain management: adequate  Airway patency: patent  Anesthetic complications: no  Cardiovascular status: acceptable  Respiratory status: acceptable  Hydration status: acceptable  Post anesthesia nausea and vomiting:  controlled  Final Post Anesthesia Temperature Assessment:  Normothermia (36.0-37.5 degrees C)      INITIAL Post-op Vital signs:   Vitals Value Taken Time   /79 06/07/22 1256   Temp 36.6 °C (97.8 °F) 06/07/22 1140   Pulse 55 06/07/22 1259   Resp 17 06/07/22 1259   SpO2 97 % 06/07/22 1259   Vitals shown include unvalidated device data.

## 2022-06-07 NOTE — DISCHARGE INSTRUCTIONS
Post Operative Discharge Instructions    1. No driving for 24 hours after surgery and off of prescription pain medication. 2. Avoid activities that bump or cause jarring movements at the surgical site for 10 days. 3. No lifting more than 10-15 pounds for 6 weeks after surgery or until cleared for activity at your follow up.    4. Walking is encouraged after surgery. 5. Stairs are ok to climb. DIET:     Diet as tolerated. Start with liquids then advance your diet based on how you fell.  No alcoholic beverages for 24 hours after surgery or while on antibiotics or pain mdications.  Drink plenty of water. MEDICATIONS:     Use daily stool softners (over the counter such as Colace or Senekot) while on pain medications.  Resume pre-operative medications. Aspirin can be resumed 3 days after surgery if this was stopped before surgery      Use prescriptions given or Tylenol, Ibuprofen as needed for pain.  Do not use more than 4000mg of Tylenol (acetaminophen) per day. Be aware this may be  in your prescription medication as well.  Be aware narcotic prescriptions are tightly controlled in the [address]. If requiring more than one refill, a follow up appointment will be required. WOUND CARE:       You have skin glue, you may shower in 24 hours and pat dry. Glue will fall off on it's own     Do not tub bathe, swim, or soak incisions until cleared to do so at your follow up.  Ice bag to the affected area; 20 minutes on and 20 minutes off if desired. FOLLOW UP CARE:     You should have an appointment scheduled within 14 days after surgery. If this is not yet scheduled, call the office.  Any forms that you need filled out regarding your medical care can be brought to the office at follow up appointment of faxed to: [id number] [address] IF:   Temperature is over 101 degrees, a slight fever can be normal 24-48 hour after surgery.    Nausea & vomiting that persists more than 24 hours after surgery.  Your wound appears very red, hot, painful or swollen.  Excessive bleeding occurs form the incision. *Between the hours 9-5 Monday-Friday please call the office at [phone]. If you do not receive a call back the same day, please do not hesitate to call my cell phone at [phone]    *If there is a medical emergency please go to the nearest emergency room immediately and do not hesitate to call my cell phone    *Weekends, after hours please call my cell phone    Patient Education        Inguinal Hernia Repair Surgery: What to Expect at Home  Your Recovery     After surgery to repair a hernia, you're likely to have pain for a few days. You may also feel tired and have less energy than normal. This is common. You should start to feel better after a few days. And you'll probably feel much better in 7 days. For a few weeks you may feel discomfort or pulling in the groin area when you move. You may have some bruising near the repair site and on your genitals. This is normal.  This care sheet gives you a general idea about how long it will take for you to recover. But each person recovers at a different pace. Follow the steps below to get better as quickly as possible. How can you care for yourself at home? Activity    · Rest when you feel tired.     · You may shower 24 to 48 hours after surgery, if your doctor okays it. Pat the incision dry. Do not take a bath for the first 2 weeks, or until your doctor tells you it is okay.     · Allow the area to heal. Don't move quickly or lift anything heavy until you are feeling better.     · Be active. Walking is a good choice.     · You most likely can return to light activity after 1 to 3 weeks, depending on the type of surgery you had. Diet    · You can eat your normal diet.  If your stomach is upset, try bland, low-fat foods like plain rice, broiled chicken, toast, and yogurt.     · If your bowel movements are not regular right after surgery, try to avoid constipation and straining. Drink plenty of water. Your doctor may suggest fiber, a stool softener, or a mild laxative. Medicines    · Be safe with medicines. Read and follow all instructions on the label. ? If the doctor gave you a prescription medicine for pain, take it as prescribed. ? If you are not taking a prescription pain medicine, ask your doctor if you can take an over-the-counter medicine.     · Your doctor will tell you if and when you can restart your medicines. He or she will also give you instructions about taking any new medicines. Incision care    · You will have a dressing over the cut (incision). A dressing helps the cut heal and protects it. Your doctor will tell you how to take care of this.     · If you have skin adhesive on the cut, leave it on until it falls off. Skin adhesive is also called liquid stitches or glue.     · If you have strips of tape on the cut, leave the tape on for a week or until it falls off.     · If you had stitches, your doctor will tell you when to come back to have them removed.     · Wash the area daily with warm, soapy water, and pat it dry. Don't use hydrogen peroxide or alcohol. They can slow healing. Ice    · Put ice or a cold pack on the area for 10 to 20 minutes at a time. Try to do this every 1 to 2 hours for the next 3 days (when you are awake) or until the swelling goes down. Put a thin cloth between the ice and your skin. Follow-up care is a key part of your treatment and safety. Be sure to make and go to all appointments, and call your doctor if you are having problems. It's also a good idea to know your test results and keep a list of the medicines you take. When should you call for help? Call 911 anytime you think you may need emergency care. For example, call if:    · You passed out (lost consciousness).     · You are short of breath.    Call your doctor now or seek immediate medical care if:    · You have pain that does not get better after you take pain medicine.     · You have loose stitches, or your incision comes open.     · Bright red blood has soaked through your bandage.     · You are sick to your stomach or cannot drink fluids.     · You have signs of a blood clot in your leg (called a deep vein thrombosis), such as:  ? Pain in your calf, back of the knee, thigh, or groin. ? Redness and swelling in your leg or groin.     · You cannot pass stools or gas.     · You have symptoms of infection, such as:  ? Increased pain, swelling, warmth, or redness. ? Red streaks leading from the incision. ? Pus draining from the incision. ? A fever. Watch closely for changes in your health, and be sure to contact your doctor if you have any problems. Where can you learn more? Go to http://www.gray.com/  Enter D758 in the search box to learn more about \"Inguinal Hernia Repair Surgery: What to Expect at Home. \"  Current as of: September 8, 2021               Content Version: 13.2  © 2006-2022 [employer]. Care instructions adapted under license by [employer] (which disclaims liability or warranty for this information). If you have questions about a medical condition or this instruction, always ask your healthcare professional. [address] any warranty or liability for your use of this information. DISCHARGE SUMMARY from Nurse    PATIENT INSTRUCTIONS:    After general anesthesia or intravenous sedation, for 24 hours or while taking prescription Narcotics:  · Limit your activities  · Do not drive and operate hazardous machinery  · Do not make important personal or business decisions  · Do  not drink alcoholic beverages  · If you have not urinated within 8 hours after discharge, please contact your surgeon on call.     Report the following to your surgeon:  · Excessive pain, swelling, redness or odor of or around the surgical area  · Temperature over 100.5  · Nausea and vomiting lasting longer than 4 hours or if unable to take medications  · Any signs of decreased circulation or nerve impairment to extremity: change in color, persistent  numbness, tingling, coldness or increase pain  · Any questions    What to do at Home:      *  Please give a list of your current medications to your Primary Care Provider. *  Please update this list whenever your medications are discontinued, doses are      changed, or new medications (including over-the-counter products) are added. *  Please carry medication information at all times in case of emergency situations. These are general instructions for a healthy lifestyle:    No smoking/ No tobacco products/ Avoid exposure to second hand smoke  Surgeon General's Warning:  Quitting smoking now greatly reduces serious risk to your health. Obesity, smoking, and sedentary lifestyle greatly increases your risk for illness    A healthy diet, regular physical exercise & weight monitoring are important for maintaining a healthy lifestyle    You may be retaining fluid if you have a history of heart failure or if you experience any of the following symptoms:  Weight gain of 3 pounds or more overnight or 5 pounds in a week, increased swelling in our hands or feet or shortness of breath while lying flat in bed. Please call your doctor as soon as you notice any of these symptoms; do not wait until your next office visit. The discharge information has been reviewed with the patient. The patient verbalized understanding. Discharge medications reviewed with the patient and appropriate educational materials and side effects teaching were provided.   ___________________________________________________________________________________________________________________________________

## 2022-06-07 NOTE — INTERVAL H&P NOTE
Update History & Physical    The Patient's History and Physical of June 7, 2022  was reviewed with the patient and I examined the patient. There was no change. The surgical site was confirmed by the patient and me. Plan:  The risk, benefits, expected outcome, and alternative to the recommended procedure have been discussed with the patient. Patient understands and wants to proceed with the procedure.     Electronically signed by Colonel Pamela DO on 6/7/2022 at 9:43 AM

## 2022-06-07 NOTE — OP NOTES
Open Right Inguinal Hernia with mesh Operative Report    Patient Name: Christian Tahira: 24     : 1950     AGE: 81 y.o. Anesthesiologist: Anesthesiologist: Baljinder Leyva DO  CRNA: Annabel Gómez CRNA     Anesthesia: General , local    PreOp DX: Right inguinal hernia [K40.90]     PostOp DX: same    Procedure: Open right inguinal hernia repair with mesh    Procedure Details: After informed consent was obtained the patient was taken to the operating room and placed in the supine position. General anesthesia was administered by the anesthetist to titrate to effect. The right groin was prepped and draped in the usual sterile fashion and a timeout procedure was performed. Next using a 15 blade scalpel an oblique incision was made superior to the inguinal ligament. Bovie electrocautery was used to dissect through campers and schuyler's fascia. The external oblique aponeurosis was then sharply incised through the external ring. A penrose drain was then placed around the spermatic cord and contents. A very large hernia sac containing small bowel was then  from the spermatic cord and reduced back into the abdomen after using a combination of blunt and bovie dissection. The inguinal floor was very patulous and reapproximated with 0 ethibond suture to ensure contents stayed reduced. A piece of progrip mesh was then cut to fit widely over the floor, pubic tubercle and then around the cord. It was secured at the pubic tubercle and inguinal ligament and internal oblique. The wound was irrigated and suctioned dry and found to be hemostatic. The external oblique aponeurosis was then closed with a 2-0 vicryl suture recreating the external ring. Scarpas fascia was closed with 2-0 vicryl in a simple interrupted fashion. The deep dermis was reapproximated with 3-0 vicryl in a simple interrupted fashion. The skin incision was then closed with 4-0 Monocryl in a subcuticular manner. Dermabond dressing was then applied. The patient was subsequently extubated, tolerated the procedure well and sent to recovery in stable condition.       Estimated Blood Loss:  10cc    Blood products: none    Specimens: none            Complications: None           Disposition: extubated, tolerated procedure well            Condition: Stable    Noy Atkins DO

## 2022-06-07 NOTE — ANESTHESIA PREPROCEDURE EVALUATION
Relevant Problems   No relevant active problems       Anesthetic History   No history of anesthetic complications            Review of Systems / Medical History  Patient summary reviewed and pertinent labs reviewed    Pulmonary  Within defined limits                 Neuro/Psych   Within defined limits           Cardiovascular  Within defined limits                     GI/Hepatic/Renal  Within defined limits              Endo/Other        Arthritis     Other Findings              Physical Exam    Airway  Mallampati: II  TM Distance: 4 - 6 cm  Neck ROM: normal range of motion   Mouth opening: Normal     Cardiovascular  Regular rate and rhythm,  S1 and S2 normal,  no murmur, click, rub, or gallop             Dental  No notable dental hx       Pulmonary  Breath sounds clear to auscultation               Abdominal  GI exam deferred       Other Findings            Anesthetic Plan    ASA: 2  Anesthesia type: general          Induction: Intravenous  Anesthetic plan and risks discussed with: Patient

## 2022-06-22 ENCOUNTER — OFFICE VISIT (OUTPATIENT)
Dept: SURGERY | Age: 72
End: 2022-06-22
Payer: MEDICARE

## 2022-06-22 VITALS
RESPIRATION RATE: 18 BRPM | WEIGHT: 206 LBS | HEIGHT: 73 IN | SYSTOLIC BLOOD PRESSURE: 126 MMHG | DIASTOLIC BLOOD PRESSURE: 80 MMHG | BODY MASS INDEX: 27.3 KG/M2 | OXYGEN SATURATION: 97 % | TEMPERATURE: 98 F | HEART RATE: 62 BPM

## 2022-06-22 DIAGNOSIS — Z98.890 S/P INGUINAL HERNIA REPAIR: Primary | ICD-10-CM

## 2022-06-22 DIAGNOSIS — Z87.19 S/P INGUINAL HERNIA REPAIR: Primary | ICD-10-CM

## 2022-06-22 PROCEDURE — 99024 POSTOP FOLLOW-UP VISIT: CPT | Performed by: SURGERY

## 2022-06-22 NOTE — PROGRESS NOTES
Chief Complaint   Patient presents with    Post OP Follow Up     right inguinal hernia repair   1. Have you been to the ER, urgent care clinic since your last visit? Hospitalized since your last visit? No    2. Have you seen or consulted any other health care providers outside of the [address] [place] [address] since your last visit? Include any pap smears or colon screening.  No

## 2022-06-22 NOTE — PROGRESS NOTES
CC:   Chief Complaint   Patient presents with    Post OP Follow Up     right inguinal hernia repair        Assessment:    ICD-10-CM ICD-9-CM    1. S/P inguinal hernia repair  Z98.890 V45.89     Z87.19         Plan: He is doing as expected after hernia surgery. He should continue to avoid lifting anything more than 10 pounds for the next 4 weeks then can resume activity as tolerated after that time. There is no need for additional follow up unless he has questions or concerns in the future. HPI:  Marcio Beckwith is a 81 y.o. male who underwent open right inguinal hernia repair with mesh on 6/7/2022. Overall he is doing well with improvement in swelling and pain. No fevers, chills, or drainage from his incision. He is taking miralax every other day to maintain his bowel regimen. Allergies: Allergies   Allergen Reactions    Demerol [Meperidine] Nausea Only       Medication Review:  Current Outpatient Medications on File Prior to Visit   Medication Sig Dispense Refill    aspirin delayed-release 81 mg tablet 162 mg daily. Last dose 6/3/22 for 6/7 sx      omeprazole (PRILOSEC) 20 mg capsule Take 20 mg by mouth as needed.  rosuvastatin (CRESTOR) 10 mg tablet Take 10 mg by mouth daily.  cholecalciferol, vitamin D3, (Vitamin D3) 50 mcg (2,000 unit) tab Take 4,000 Units by mouth daily.  ubidecarenone (COQ-10 PO) Take  by mouth daily.  alendronate (FOSAMAX) 40 mg tablet Take  by mouth every seven (7) days. No current facility-administered medications on file prior to visit. Systems Review:  Review of Systems   Constitutional: Negative for fever. Gastrointestinal: Negative for abdominal pain.        PMH:  Past Medical History:   Diagnosis Date    Arthritis     Inguinal hernia     right    Osteoporosis     Rheumatoid arthritis ([address]     in remission for 2 years    Shoulder pain, right        Surgical History:  Past Surgical History:   Procedure Laterality Date    HX HERNIA REPAIR Left 2018    HX HERNIA REPAIR Right 2022    UT CARDIAC SURG PROCEDURE UNLIST  2009    stents placed       Social History:  Social History     Socioeconomic History    Marital status:    Tobacco Use    Smoking status: Never Smoker    Smokeless tobacco: Never Used   Vaping Use    Vaping Use: Never used   Substance and Sexual Activity    Alcohol use: Yes     Comment: 3-4 drinks per week    Drug use: Yes     Types: Marijuana     Comment: Medical Marijuana       Family History:  History reviewed. No pertinent family history.     Hospital Outpatient Visit on 05/18/2022   Component Date Value Ref Range Status    Ventricular Rate 05/18/2022 55  BPM Final    Atrial Rate 05/18/2022 55  BPM Final    P-R Interval 05/18/2022 154  ms Final    QRS Duration 05/18/2022 88  ms Final    Q-T Interval 05/18/2022 444  ms Final    QTC Calculation (Bezet) 05/18/2022 424  ms Final    Calculated P Axis 05/18/2022 59  degrees Final    Calculated R Axis 05/18/2022 76  degrees Final    Calculated T Axis 05/18/2022 53  degrees Final    Diagnosis 05/18/2022    Final                    Value:Sinus bradycardia  Otherwise normal ECG  No previous ECGs available  Confirmed by Yan Chan ([id number]) on 5/18/2022 2:10:36 PM     Hospital Outpatient Visit on 05/18/2022   Component Date Value Ref Range Status    WBC 05/18/2022 6.7  4.6 - 13.2 K/uL Final    RBC 05/18/2022 4.77  4.35 - 5.65 M/uL Final    HGB 05/18/2022 14.9  13.0 - 16.0 g/dL Final    HCT 05/18/2022 44.7  36.0 - 48.0 % Final    MCV 05/18/2022 93.7  78.0 - 100.0 FL Final    MCH 05/18/2022 31.2  24.0 - 34.0 PG Final    MCHC 05/18/2022 33.3  31.0 - 37.0 g/dL Final    RDW 05/18/2022 13.0  11.6 - 14.5 % Final    PLATELET 49/07/8688 971  135 - 420 K/uL Final    MPV 05/18/2022 10.6  9.2 - 11.8 FL Final    NRBC 05/18/2022 0.0  0.0  WBC Final    ABSOLUTE NRBC 05/18/2022 0.00  0.00 - 0.01 K/uL Final    NEUTROPHILS 05/18/2022 63  40 - 73 % Final    LYMPHOCYTES 05/18/2022 25  21 - 52 % Final    MONOCYTES 05/18/2022 7  3 - 10 % Final    EOSINOPHILS 05/18/2022 5  0 - 5 % Final    BASOPHILS 05/18/2022 0  0 - 2 % Final    IMMATURE GRANULOCYTES 05/18/2022 0  0 - 0.5 % Final    ABS. NEUTROPHILS 05/18/2022 4.2  1.8 - 8.0 K/UL Final    ABS. LYMPHOCYTES 05/18/2022 1.7  0.9 - 3.6 K/UL Final    ABS. MONOCYTES 05/18/2022 0.5  0.05 - 1.2 K/UL Final    ABS. EOSINOPHILS 05/18/2022 0.3  0.0 - 0.4 K/UL Final    ABS. BASOPHILS 05/18/2022 0.0  0.0 - 0.1 K/UL Final    ABS. IMM. GRANS. 05/18/2022 0.0  0.00 - 0.04 K/UL Final    DF 05/18/2022 AUTOMATED    Final    Sodium 05/18/2022 138  136 - 145 mmol/L Final    Potassium 05/18/2022 4.0  3.5 - 5.5 mmol/L Final    Chloride 05/18/2022 103  100 - 111 mmol/L Final    CO2 05/18/2022 31  21 - 32 mmol/L Final    Anion gap 05/18/2022 4  3.0 - 18.0 mmol/L Final    Glucose 05/18/2022 102* 74 - 99 mg/dL Final    BUN 05/18/2022 32* 7 - 18 mg/dL Final    Creatinine 05/18/2022 1.19  0.60 - 1.30 mg/dL Final    BUN/Creatinine ratio 05/18/2022 27* 12 - 20   Final    GFR est AA 05/18/2022 >60  >60 ml/min/1.73m2 Final    GFR est non-AA 05/18/2022 >60  >60 ml/min/1.73m2 Final    Comment: Estimated GFR is calculated using the IDMS-traceable Modification of Diet in Renal Disease (MDRD) Study equation, reported for both  Americans (GFRAA) and non- Americans (GFRNA), and normalized to 1.73m2 body surface area. The physician must decide which value applies to the patient. The MDRD study equation should only be used in individuals age 25 or older. It has not been validated for the following: pregnant women, patients with serious comorbid conditions, or on certain medications, or persons with extremes of body size, muscle mass, or nutritional status.       Calcium 05/18/2022 9.3  8.5 - 10.1 mg/dL Final       DEXA BONE DENSITY STUDY AXIAL  Narrative: DXA BONE DENSITOMETRY, CENTRAL    INDICATION:  Postmenopausal, screening. COMPARISON: None    TECHNIQUE: Using GE LUNAR Prodigy densitometer, bone density measurement was  performed in the lumbar spine in addition to the proximal left and right femurs. T-score refers to standard deviations above or below average compared to a  young adult of the same sex. Z-score refers to standard deviations above or  below average compared to a patient of the same sex, age, race and weight. VALIDITY:    Valid at the spine and left hip. RESULTS:    The bone mineral density of the L1-L4 vertebral bodies is 1.177 g/cm2. This  corresponds to a T-score of 3.1. The bone mineral density of the left femoral neck is 0.921 g/cm2. This  corresponds to a T-score of -0.8. FRAX CALCULATION 10-YEAR PROBABILITY OF FRACTURE:    Major osteoporotic compression fracture: 12.0 %  Hip fracture: 2.2 %    The [address] [place] [address] has recommended that treatment be  considered for those with a 10-year probability of:    Major osteoporotic compression fracture: >20%  Hip fracture: >3%  Patient preferences may indicate treatment for people with 10-year fracture  probabilities above or below these levels. Impression: Bone mineral density measures within normal limits. COMMENTS:  Based upon current ISCD guidelines, the patient's overall diagnostic criteria,  selected using WHO criteria in postmenopausal women and males aged 48 and above,  is selected based upon the lowest T-score from among the lumbar spine, femoral  neck or 33% radius (if measured). WHO Definition of Osteoporosis and Osteopenia on DXA (specified for  post-menopausal  females):      Normal:                     T-Score at or above -1 SD    Osteopenia:              T-Score between -1 and -2.5 SD    Osteoporosis:           T-Score at or below -2.5 SD    The risk of fracture approximately doubles for each 1 SD decrease in T-score.    It is important to consider other factors in assessing a patient's risk of  fracture, including age, risk of falling/injury, history of fragility fracture,  family history of osteoporosis, smoking, low weight. RECOMMENDATIONS:  National Osteoporosis Foundation (NOF) guidelines recommend initiating therapy  to reduce fracture risk in women with BMD: T-score below -2.5 SD or T-score  below -1.5 SD with other risk factors present, specifically verbal fracture  (clinical or asymptomatic) or hip fracture. * Mild to aggressive therapies are available in the form of hormone replacement  therapy (HRT), bisphosphonates, Calcitonin, and SERMs. Additionally, all  patients should insure an adequate intake of dietary calcium (1200 mg/d) and  vitamin D (400-800 IU daily). * People with diagnosed cases of osteoporosis or osteopenia should be regularly  tested for bone mineral density. For patient's eligible for Medicare, routine  testing is allowed once every 2 years. The testing frequently can be increased  to one year for patients who have rapidly progressing disease, or for those who  are receiving medical therapy to restore bone mass. Physical Exam:  Visit Vitals  /80   Pulse 62   Temp 98 °F (36.7 °C)   Resp 18   Ht 6' 1\" (1.854 m)   Wt 93.4 kg (206 lb)   SpO2 97%   BMI 27.18 kg/m²    BMI: Body mass index is 27.18 kg/m². Physical Exam  Constitutional:       Appearance: Normal appearance. He is normal weight. Cardiovascular:      Rate and Rhythm: Normal rate. Abdominal:      General: Abdomen is flat. Comments: Incision well healed, healing ridge noted, no seroma or hernia   Neurological:      Mental Status: He is alert. I have reviewed the information entered by the clinical staff and/or patient and verified it as accurate or edited where necessary.      Electronically signed by:    July Perkins DO, MPH

## 2024-04-01 ENCOUNTER — HOSPITAL ENCOUNTER (OUTPATIENT)
Age: 74
Discharge: HOME | End: 2024-04-04
Payer: MEDICARE

## 2024-04-01 DIAGNOSIS — M54.50 LUMBAR BACK PAIN: ICD-10-CM

## 2024-04-01 PROCEDURE — 72100 X-RAY EXAM L-S SPINE 2/3 VWS: CPT

## 2024-04-01 PROCEDURE — 72070 X-RAY EXAM THORAC SPINE 2VWS: CPT

## 2024-04-16 ENCOUNTER — HOSPITAL ENCOUNTER (OUTPATIENT)
Age: 74
Setting detail: RECURRING SERIES
Discharge: HOME | End: 2024-04-19
Payer: MEDICARE

## 2024-04-16 PROCEDURE — 97162 PT EVAL MOD COMPLEX 30 MIN: CPT

## 2024-04-16 NOTE — THERAPY EVALUATION
[facility] REHABILITATION  PHYSICAL THERAPY  [facility], [address] [place]  [address]  Phone: [phone]    Fax: [phone]     PLAN OF CARE / STATEMENT OF MEDICAL NECESSITY FOR PHYSICAL THERAPY SERVICES  Patient Name: Akira Marshall : 1950   Medical   Diagnosis: Low back pain, unspecified [M54.50] Treatment Diagnosis: ***   Onset Date: ***     Referral Source: Erlin Hoff MD Start of Care (SOC): 2024   Prior Hospitalization: See medical history Provider #: [id number]   Prior Level of Function: ***   Comorbidities: ***   Medications: Verified on Patient Summary List   The Plan of Care and following information is based on the information from the initial evaluation.   ==========================================================================================  Assessment / Functional Analysis:    Pt is a 73 y.o. year old *** male who presents to outpatient clinic today ***      ==========================================================================================  Eval Complexity: History: {PT OP History:1367656537}Exam:{PT OP Examination:9468079977} Presentation: {PT OP Presentation:7819347047} Clinical Decision Making:{PT OP Decision Makin}Overall Complexity:{PT OP Complexity:2122708244}    Problem List: {BSHSI IP PROBLEM LIST:93722:a}   Treatment Plan may include any combination of the following: {Outpt PT Treatment Plan:30964:a}  Patient / Family readiness to learn indicated by: {Outpt PT Patient Family Readiness to Learn:11853:a}  Persons(s) to be included in education: {IM Persons Education:34916}  Barriers to Learning/Limitations: {barriers to learning/limitations:19384}      Patient self reported health status: {Outpt PT Rehabilitation Potential:70973}  Rehabilitation Potential: {Outpt PT Rehabilitation Potential:14332}    Objective Measures:      Short Term Goals:  ***  ***  ***    Long Term Goals:  ***  ***  ***  ***      Frequency /

## 2024-04-16 NOTE — THERAPY EVALUATION
THORACOLUMBAR EVAL/ PT DAILY TREATMENT NOTE 10-18    Patient Name: Akira Marshall  Date:2024  : 1950  [x]  Patient  Verified  Payor: [facility] MEDICARE / Plan: [facility] MEDICARE ADVANTAGE / Product Type: *No Product type* /    In time:904  Out time:942  Total Treatment Time (min): 38  Visit #: 1    Medicare/BCBS Only   Total Timed Codes (min):  0 1:1 Treatment Time:  38     Treatment Area: Low back pain, unspecified [M54.50]      Subjective:     Pt presents today with complaints of low back pain. Pt recalls pain x 12 years related to body building. Pt had veteroplasty in . Pt reports pain with bending, is unable to play golf or do yard work. Osteopenia present in spine. Pt relies on marijuana for pain relief. Pt denies any numbness nor tingling. Pt is independent, no AD required. No recent falls reported. No changes in bowel or bladder habits.     Patient Goals: \"to not be in pain\"    Pain Level (0-10 scale): Current: 2/10   Worst: 8/10 sharp  []Sharp  []Dull  []Achy []Burning []Throbbing []N&T []Other:  []constant []intermittent []improving []worsening [x]no change since onset         Past Medical History:   Diagnosis Date    Arthritis     Inguinal hernia     right    Osteoporosis     Rheumatoid arthritis (HCC)     in remission for 2 years    Shoulder pain, right      Past Surgical History:   Procedure Laterality Date    HERNIA REPAIR Left 2018    HERNIA REPAIR Right     MD CARDIAC SURG PROCEDURE UNLIST      stents placed       General Health:  Red Flags Indicated? [] Yes    [x] No  [] Yes [] No Recent weight change (If yes, due to dieting? [] Yes  [] No)   [] Yes [] No Weakness in legs during walking  [] Yes [] No Unremitting pain at night  [] Yes [] No Abdominal pain or problems  [] Yes [] No Rectal bleeding  [] Yes [] No Feet more cold or painful in cold weather  [] Yes [] No Menstrual irregularities  [] Yes [] No Blood or pain with urination  [] Yes [] No Dysfunction of bowel or

## 2024-05-08 ENCOUNTER — OFFICE VISIT (OUTPATIENT)
Age: 74
End: 2024-05-08
Payer: MEDICARE

## 2024-05-08 VITALS
HEIGHT: 73 IN | OXYGEN SATURATION: 98 % | HEART RATE: 58 BPM | WEIGHT: 224 LBS | BODY MASS INDEX: 29.69 KG/M2 | TEMPERATURE: 98.8 F

## 2024-05-08 DIAGNOSIS — M81.0 OSTEOPOROSIS, UNSPECIFIED OSTEOPOROSIS TYPE, UNSPECIFIED PATHOLOGICAL FRACTURE PRESENCE: ICD-10-CM

## 2024-05-08 DIAGNOSIS — M51.34 DDD (DEGENERATIVE DISC DISEASE), THORACIC: ICD-10-CM

## 2024-05-08 DIAGNOSIS — M47.814 THORACIC SPONDYLOSIS: ICD-10-CM

## 2024-05-08 DIAGNOSIS — M43.16 SPONDYLOLISTHESIS OF LUMBAR REGION: ICD-10-CM

## 2024-05-08 DIAGNOSIS — M48.061 SPINAL STENOSIS OF LUMBAR REGION, UNSPECIFIED WHETHER NEUROGENIC CLAUDICATION PRESENT: Primary | ICD-10-CM

## 2024-05-08 DIAGNOSIS — M51.36 DDD (DEGENERATIVE DISC DISEASE), LUMBAR: ICD-10-CM

## 2024-05-08 DIAGNOSIS — M47.816 LUMBAR SPONDYLOSIS: ICD-10-CM

## 2024-05-08 PROCEDURE — 1123F ACP DISCUSS/DSCN MKR DOCD: CPT | Performed by: PHYSICAL MEDICINE & REHABILITATION

## 2024-05-08 PROCEDURE — 99204 OFFICE O/P NEW MOD 45 MIN: CPT | Performed by: PHYSICAL MEDICINE & REHABILITATION

## 2024-05-08 RX ORDER — TADALAFIL 5 MG/1
5 TABLET ORAL PRN
COMMUNITY

## 2024-05-08 RX ORDER — METHYLPREDNISOLONE 4 MG/1
TABLET ORAL
Qty: 1 KIT | Refills: 0 | Status: SHIPPED | OUTPATIENT
Start: 2024-05-08

## 2024-05-08 RX ORDER — METOPROLOL TARTRATE 50 MG/1
50 TABLET, FILM COATED ORAL 2 TIMES DAILY
COMMUNITY

## 2024-05-08 NOTE — PROGRESS NOTES
[facility]  [address]  [place] [address]  Tel: [phone]  Fax: [phone]          INITIAL CONSULTATION      HISTORY OF PRESENT ILLNESS:    Akira Marshall is a 73 y.o. male who is referred from Dr. Erlin Hoff secondary to low back pain. He rates his pain 0-10/10. Patient comes into the office with c/o localized low back pain, x several years, exacerbated over the last year w/o injury. Patient says his pain is progressive in nature. He denies change in bowel or bladder habits. He denies loss of balance, falls, or impairments manual dexterity. His pain is exacerbated by bending. He denies recent fevers, weight loss, rashes, or skin sores. He denies a hx of stomach ulcers or bleeding disorders. He denies a hx of history of spinal injections. Per pt report, he underwent a kyphoplasty after fracturing a rib 9 years ago. He denies recent physical therapy or chiropractic care. He denies a hx of DM. He reports that to his knowledge his kidneys function properly, GFR over 60 on 5/18/2022. He has taken Aleve and Tylenol without benefit. Per pt report, pt broke a rib 9 years ago after twisting, w/o injury. Pt has been on Prilosec x 25 years for \"GI issues\". PmHx of rheumatoid arthritis (not followed by rheumatology), coronary artery disease (on Aspirin), osteoporosis (on Fosamax under PCP).    Note from Dr. Erlin Hoff dated 4/1/2024 indicating patient was seen for chronic low back pain aggravated with movement. Referred to me. Suggested pt to take Tylenol PRN.      T spine XR dated 4/3/2024 films were independently reviewed by me. Per report, Osteopenia, vertebral compression fractures, and spondylosis.    L spine XR dated 4/3/2024 films were independently reviewed. Per report, Osteopenia, vertebral compression fractures, and spondylosis. Upon my review, Grade 1 anterolisthesis of L4 on L5. Grade 1 retrolisthesis of L3 on L4, of L2 on L3.    DEXA bone density

## 2024-05-15 ENCOUNTER — HOSPITAL ENCOUNTER (OUTPATIENT)
Age: 74
Discharge: HOME | End: 2024-05-18
Payer: MEDICARE

## 2024-05-15 DIAGNOSIS — M48.061 SPINAL STENOSIS OF LUMBAR REGION, UNSPECIFIED WHETHER NEUROGENIC CLAUDICATION PRESENT: ICD-10-CM

## 2024-05-15 PROCEDURE — 72148 MRI LUMBAR SPINE W/O DYE: CPT

## 2024-06-12 ENCOUNTER — OFFICE VISIT (OUTPATIENT)
Age: 74
End: 2024-06-12
Payer: MEDICARE

## 2024-06-12 VITALS
TEMPERATURE: 98 F | HEART RATE: 58 BPM | WEIGHT: 219 LBS | HEIGHT: 73 IN | OXYGEN SATURATION: 98 % | BODY MASS INDEX: 29.03 KG/M2

## 2024-06-12 DIAGNOSIS — M48.061 SPINAL STENOSIS OF LUMBAR REGION, UNSPECIFIED WHETHER NEUROGENIC CLAUDICATION PRESENT: Primary | ICD-10-CM

## 2024-06-12 DIAGNOSIS — M47.816 LUMBAR SPONDYLOSIS: ICD-10-CM

## 2024-06-12 DIAGNOSIS — M71.38 SYNOVIAL CYST OF LUMBAR FACET JOINT: ICD-10-CM

## 2024-06-12 DIAGNOSIS — M51.36 DDD (DEGENERATIVE DISC DISEASE), LUMBAR: ICD-10-CM

## 2024-06-12 DIAGNOSIS — M47.816 FACET ARTHROPATHY, LUMBAR: ICD-10-CM

## 2024-06-12 PROCEDURE — 1123F ACP DISCUSS/DSCN MKR DOCD: CPT | Performed by: PHYSICAL MEDICINE & REHABILITATION

## 2024-06-12 PROCEDURE — 99214 OFFICE O/P EST MOD 30 MIN: CPT | Performed by: PHYSICAL MEDICINE & REHABILITATION

## 2024-06-12 NOTE — PROGRESS NOTES
[facility]  [address]  [place] [address]  Tel: [phone]  Fax: [phone]          PROGRESS NOTE      HISTORY OF PRESENT ILLNESS:  The patient is a 74 y.o. male and was seen today for follow up of low back pain. Previously seen for low back pain. He rates his pain 0-10/10. Patient comes into the office with c/o localized low back pain, x several years, exacerbated over the last year w/o injury. Patient says his pain is progressive in nature. He denies change in bowel or bladder habits. He denies loss of balance, falls, or impairments manual dexterity. His pain is exacerbated by bending. He denies recent fevers, weight loss, rashes, or skin sores. He denies a hx of stomach ulcers or bleeding disorders. He denies a hx of history of spinal injections. Per pt report, he underwent a kyphoplasty after fracturing a rib 9 years ago. He denies recent physical therapy or chiropractic care. He denies a hx of DM. He reports that to his knowledge his kidneys function properly, GFR over 60 on 5/18/2022. He has taken Aleve and Tylenol without benefit. Per pt report, pt broke a rib 9 years ago after twisting, w/o injury. Pt has been on Prilosec x 25 years for \"GI issues\". PmHx of rheumatoid arthritis (not followed by rheumatology), coronary artery disease (on Aspirin), osteoporosis (on Fosamax under PCP). Note from Dr. Erlin Hoff dated 4/1/2024 indicating patient was seen for chronic low back pain aggravated with movement. Referred to me. Suggested pt to take Tylenol PRN. T spine XR dated 4/3/2024 films were independently reviewed by me. Per report, Osteopenia, vertebral compression fractures, and spondylosis. L spine XR dated 4/3/2024 films were independently reviewed. Per report, Osteopenia, vertebral compression fractures, and spondylosis. Upon my review, Grade 1 anterolisthesis of L4 on L5. Grade 1 retrolisthesis of L3 on L4, of L2 on L3. DEXA bone density

## 2024-06-27 ENCOUNTER — HOSPITAL ENCOUNTER (OUTPATIENT)
Facility: HOSPITAL | Age: 74
Discharge: HOME | End: 2024-06-30

## 2024-06-27 VITALS
HEART RATE: 63 BPM | SYSTOLIC BLOOD PRESSURE: 141 MMHG | OXYGEN SATURATION: 99 % | RESPIRATION RATE: 16 BRPM | DIASTOLIC BLOOD PRESSURE: 82 MMHG | TEMPERATURE: 98.2 F

## 2024-06-27 RX ORDER — DIAZEPAM 5 MG/1
2.5 TABLET ORAL ONCE
Status: DISCONTINUED | OUTPATIENT
Start: 2024-06-27 | End: 2024-07-01 | Stop reason: HOSPADM

## 2024-06-27 RX ORDER — DIAZEPAM 5 MG/1
5 TABLET ORAL ONCE
Status: DISCONTINUED | OUTPATIENT
Start: 2024-06-27 | End: 2024-07-01 | Stop reason: HOSPADM

## 2024-06-27 RX ORDER — DEXAMETHASONE SODIUM PHOSPHATE 10 MG/ML
10 INJECTION, SOLUTION INTRAMUSCULAR; INTRAVENOUS ONCE
Status: DISCONTINUED | OUTPATIENT
Start: 2024-06-27 | End: 2024-07-01 | Stop reason: HOSPADM

## 2024-06-27 RX ORDER — DIAZEPAM 5 MG/1
10 TABLET ORAL ONCE
Status: DISCONTINUED | OUTPATIENT
Start: 2024-06-27 | End: 2024-07-01 | Stop reason: HOSPADM

## 2024-06-27 RX ORDER — IOPAMIDOL 408 MG/ML
4 INJECTION, SOLUTION INTRATHECAL
Status: DISCONTINUED | OUTPATIENT
Start: 2024-06-27 | End: 2024-07-01 | Stop reason: HOSPADM

## 2024-06-27 RX ORDER — LIDOCAINE HYDROCHLORIDE 10 MG/ML
5-30 INJECTION, SOLUTION EPIDURAL; INFILTRATION; INTRACAUDAL; PERINEURAL ONCE
Status: DISCONTINUED | OUTPATIENT
Start: 2024-06-27 | End: 2024-07-01 | Stop reason: HOSPADM

## 2024-06-27 ASSESSMENT — PAIN DESCRIPTION - DESCRIPTORS: DESCRIPTORS: SQUEEZING;TIGHTNESS

## 2024-06-27 ASSESSMENT — PAIN - FUNCTIONAL ASSESSMENT: PAIN_FUNCTIONAL_ASSESSMENT: 0-10

## 2024-07-09 RX ORDER — DIAZEPAM 5 MG/1
10 TABLET ORAL ONCE
Status: CANCELLED | OUTPATIENT
Start: 2024-07-11

## 2024-07-09 RX ORDER — DIAZEPAM 5 MG/1
5 TABLET ORAL ONCE
Status: CANCELLED | OUTPATIENT
Start: 2024-07-11

## 2024-07-09 RX ORDER — LIDOCAINE HYDROCHLORIDE 10 MG/ML
30 INJECTION, SOLUTION EPIDURAL; INFILTRATION; INTRACAUDAL; PERINEURAL ONCE
Status: CANCELLED | OUTPATIENT
Start: 2024-07-11

## 2024-07-09 RX ORDER — DIAZEPAM 5 MG/1
2.5 TABLET ORAL ONCE
Status: CANCELLED | OUTPATIENT
Start: 2024-07-11

## 2024-07-09 RX ORDER — DEXAMETHASONE SODIUM PHOSPHATE 10 MG/ML
10 INJECTION, SOLUTION INTRAMUSCULAR; INTRAVENOUS ONCE
Status: CANCELLED | OUTPATIENT
Start: 2024-07-11

## 2024-07-11 ENCOUNTER — HOSPITAL ENCOUNTER (OUTPATIENT)
Facility: HOSPITAL | Age: 74
End: 2024-07-11
Payer: MEDICARE

## 2024-07-11 VITALS
OXYGEN SATURATION: 96 % | RESPIRATION RATE: 16 BRPM | TEMPERATURE: 98.3 F | HEART RATE: 59 BPM | SYSTOLIC BLOOD PRESSURE: 155 MMHG | DIASTOLIC BLOOD PRESSURE: 78 MMHG

## 2024-07-11 PROBLEM — M48.062 SPINAL STENOSIS, LUMBAR REGION, WITH NEUROGENIC CLAUDICATION: Status: ACTIVE | Noted: 2024-07-11

## 2024-07-11 PROCEDURE — 62323 NJX INTERLAMINAR LMBR/SAC: CPT | Performed by: PHYSICAL MEDICINE & REHABILITATION

## 2024-07-11 PROCEDURE — 6360000002 HC RX W HCPCS: Performed by: PHYSICAL MEDICINE & REHABILITATION

## 2024-07-11 PROCEDURE — 2500000003 HC RX 250 WO HCPCS: Performed by: PHYSICAL MEDICINE & REHABILITATION

## 2024-07-11 PROCEDURE — 62323 NJX INTERLAMINAR LMBR/SAC: CPT

## 2024-07-11 RX ORDER — DIAZEPAM 5 MG/1
5 TABLET ORAL ONCE
Status: ACTIVE | OUTPATIENT
Start: 2024-07-11

## 2024-07-11 RX ORDER — DIAZEPAM 5 MG/1
2.5 TABLET ORAL ONCE
Status: ACTIVE | OUTPATIENT
Start: 2024-07-11

## 2024-07-11 RX ORDER — DEXAMETHASONE SODIUM PHOSPHATE 10 MG/ML
10 INJECTION, SOLUTION INTRAMUSCULAR; INTRAVENOUS ONCE
Status: COMPLETED | OUTPATIENT
Start: 2024-07-11 | End: 2024-07-11

## 2024-07-11 RX ORDER — DIAZEPAM 5 MG/1
10 TABLET ORAL ONCE
Status: ACTIVE | OUTPATIENT
Start: 2024-07-11

## 2024-07-11 RX ORDER — LIDOCAINE HYDROCHLORIDE 10 MG/ML
30 INJECTION, SOLUTION EPIDURAL; INFILTRATION; INTRACAUDAL; PERINEURAL ONCE
Status: COMPLETED | OUTPATIENT
Start: 2024-07-11 | End: 2024-07-11

## 2024-07-11 RX ADMIN — LIDOCAINE HYDROCHLORIDE 12 ML: 10 INJECTION, SOLUTION EPIDURAL; INFILTRATION; INTRACAUDAL; PERINEURAL at 13:21

## 2024-07-11 RX ADMIN — DEXAMETHASONE SODIUM PHOSPHATE 10 MG: 10 INJECTION, SOLUTION INTRAMUSCULAR; INTRAVENOUS at 13:22

## 2024-07-11 ASSESSMENT — PAIN SCALES - GENERAL: PAINLEVEL_OUTOF10: 2

## 2024-07-11 ASSESSMENT — PAIN - FUNCTIONAL ASSESSMENT: PAIN_FUNCTIONAL_ASSESSMENT: 0-10

## 2024-07-11 ASSESSMENT — PAIN DESCRIPTION - DESCRIPTORS: DESCRIPTORS: ACHING

## 2024-07-11 NOTE — PERIOP NOTE
Patient brought to preop for blood pressure recheck. Offered water. Blood pressure back to baseline. No headache, dizziness, or chest pain. MD aware. Ok for discharge.

## 2024-07-11 NOTE — OP NOTE
Intralaminar Epidural Steroid Block Procedure Note      Patient Name: Akira Marshall    Date of Procedure: July 11, 2024    Preoperative Diagnosis: M48.062    Post Operative Diagnosis: same    Procedure: L5-S1 Epidural Block     PROCEDURE:  Lumbar Epidural Block    Consent:  Informed  Consent was obtained prior to the procedure.  The patient was given the opportunity to ask questions regarding the procedure and its associated risks.  In addition to the potential risks associated with the procedure itself, the patient was informed both verbally and in writing of potential side effects of the use glucocorticoids.  The patient appeared to comprehend the informed consent and desired to have the procedure performed.      The patient was placed in the prone position on the fluoroscopy table and the back prepped and draped in the usual sterile manner.  The interlaminar space was identified using fluoroscopy and the skin anesthetized with 1% Lidocaine.  A #18 Tuohy Epidural needle was advanced under fluoroscopic control from a paramedian approach to the  epidural space as noted above, using the loss of resistance to fluid technique.    Then, 10 mg of preservative free Dexamethasone and 2 cc of Lidocaine was slowly injected.      The patient tolerated the procedure well.  The injection area was cleaned and band aids applied.  No excessive bleeding was noted.  Patient dressed and was discharged to home with instructions.

## 2025-08-05 ENCOUNTER — APPOINTMENT (OUTPATIENT)
Age: 75
End: 2025-08-05
Payer: MEDICARE

## 2025-08-05 ENCOUNTER — HOSPITAL ENCOUNTER (EMERGENCY)
Age: 75
Discharge: HOME | End: 2025-08-05
Attending: FAMILY MEDICINE
Payer: MEDICARE

## 2025-08-05 VITALS
RESPIRATION RATE: 18 BRPM | BODY MASS INDEX: 26.39 KG/M2 | WEIGHT: 200 LBS | DIASTOLIC BLOOD PRESSURE: 85 MMHG | OXYGEN SATURATION: 99 % | TEMPERATURE: 97.8 F | SYSTOLIC BLOOD PRESSURE: 117 MMHG | HEART RATE: 62 BPM

## 2025-08-05 DIAGNOSIS — S01.81XA FACIAL LACERATION, INITIAL ENCOUNTER: ICD-10-CM

## 2025-08-05 DIAGNOSIS — U07.1 COVID-19: ICD-10-CM
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
## 2025-08-05 LAB
ALBUMIN SERPL-MCNC: 3.6 G/DL (ref 3.4–5)
ALBUMIN/GLOB SERPL: 1
ALP SERPL-CCNC: 88 U/L (ref 45–117)
ALT SERPL-CCNC: 46 U/L (ref 10–50)
ANION GAP SERPL CALC-SCNC: 15 MMOL/L
AST SERPL W P-5'-P-CCNC: 43 U/L (ref 10–38)
BASOPHILS # BLD: 0.01 K/UL (ref 0–0.1)
BASOPHILS NFR BLD: 0.1 % (ref 0–2)
BILIRUB SERPL-MCNC: 0.6 MG/DL (ref 0.2–1)
BUN SERPL-MCNC: 25 MG/DL (ref 6–23)
BUN/CREAT SERPL: 21
CA-I BLD-MCNC: 9.5 MG/DL (ref 8.5–10.1)
CHLORIDE SERPL-SCNC: 103 MMOL/L (ref 98–107)
CO2 SERPL-SCNC: 24 MMOL/L (ref 21–32)
CREAT SERPL-MCNC: 1.22 MG/DL (ref 0.6–1.3)
DIFFERENTIAL METHOD BLD: ABNORMAL
EOSINOPHIL # BLD: 0.09 K/UL (ref 0–0.4)
EOSINOPHIL NFR BLD: 1.2 % (ref 0–5)
ERYTHROCYTE [DISTWIDTH] IN BLOOD BY AUTOMATED COUNT: 13.3 % (ref 11.6–14.5)
FLUAV RNA SPEC QL NAA+PROBE: NOT DETECTED
FLUBV RNA SPEC QL NAA+PROBE: NOT DETECTED
GLOBULIN SER CALC-MCNC: 3.7 G/DL
GLUCOSE SERPL-MCNC: 114 MG/DL (ref 74–108)
HCT VFR BLD AUTO: 42.3 % (ref 36–48)
HGB BLD-MCNC: 14.4 G/DL (ref 13–16)
IMM GRANULOCYTES # BLD AUTO: 0.03 K/UL (ref 0–0.04)
IMM GRANULOCYTES NFR BLD AUTO: 0.4 % (ref 0–0.5)
LIPASE SERPL-CCNC: 32 U/L (ref 13–75)
LYMPHOCYTES # BLD: 1.03 K/UL (ref 0.9–3.6)
LYMPHOCYTES NFR BLD: 13.9 % (ref 21–52)
MAGNESIUM SERPL-MCNC: 2.4 MG/DL (ref 1.6–2.6)
MCH RBC QN AUTO: 30.7 PG (ref 24–34)
MCHC RBC AUTO-ENTMCNC: 34 G/DL (ref 31–37)
MCV RBC AUTO: 90.2 FL (ref 78–100)
MONOCYTES # BLD: 0.55 K/UL (ref 0.05–1.2)
MONOCYTES NFR BLD: 7.4 % (ref 3–10)
NEUTS SEG # BLD: 5.68 K/UL (ref 1.8–8)
NEUTS SEG NFR BLD: 77 % (ref 40–73)
NRBC # BLD: 0 K/UL (ref 0–0.01)
NRBC BLD-RTO: 0 PER 100 WBC
PLATELET # BLD AUTO: 189 K/UL (ref 135–420)
PMV BLD AUTO: 10.5 FL (ref 9.2–11.8)
POTASSIUM SERPL-SCNC: 4.2 MMOL/L (ref 3.5–5.5)
PROT SERPL-MCNC: 7.3 G/DL (ref 6.4–8.2)
RBC # BLD AUTO: 4.69 M/UL (ref 4.35–5.65)
SARS-COV-2 RNA RESP QL NAA+PROBE: DETECTED
SODIUM SERPL-SCNC: 141 MMOL/L (ref 136–145)
TROPONIN T SERPL HS-MCNC: 16.6 NG/L (ref 0–22)
TSH, 3RD GENERATION: 1.29 UIU/ML (ref 0.27–4.2)
WBC # BLD AUTO: 7.4 K/UL (ref 4.6–13.2)

## 2025-08-05 PROCEDURE — 71101 X-RAY EXAM UNILAT RIBS/CHEST: CPT

## 2025-08-05 PROCEDURE — 87636 SARSCOV2 & INF A&B AMP PRB: CPT

## 2025-08-05 PROCEDURE — 72125 CT NECK SPINE W/O DYE: CPT

## 2025-08-05 PROCEDURE — 84443 ASSAY THYROID STIM HORMONE: CPT

## 2025-08-05 PROCEDURE — 96375 TX/PRO/DX INJ NEW DRUG ADDON: CPT

## 2025-08-05 PROCEDURE — 84484 ASSAY OF TROPONIN QUANT: CPT

## 2025-08-05 PROCEDURE — 2580000003 HC RX 258: Performed by: FAMILY MEDICINE

## 2025-08-05 PROCEDURE — 83735 ASSAY OF MAGNESIUM: CPT

## 2025-08-05 PROCEDURE — 12011 RPR F/E/E/N/L/M 2.5 CM/<: CPT

## 2025-08-05 PROCEDURE — 70450 CT HEAD/BRAIN W/O DYE: CPT

## 2025-08-05 PROCEDURE — 99284 EMERGENCY DEPT VISIT MOD MDM: CPT

## 2025-08-05 PROCEDURE — 83690 ASSAY OF LIPASE: CPT

## 2025-08-05 PROCEDURE — 80053 COMPREHEN METABOLIC PANEL: CPT

## 2025-08-05 PROCEDURE — 6360000002 HC RX W HCPCS: Performed by: FAMILY MEDICINE

## 2025-08-05 PROCEDURE — 85025 COMPLETE CBC W/AUTO DIFF WBC: CPT

## 2025-08-05 PROCEDURE — 96374 THER/PROPH/DIAG INJ IV PUSH: CPT

## 2025-08-05 RX ORDER — IBUPROFEN 800 MG/1
800 TABLET, FILM COATED ORAL EVERY 8 HOURS PRN
Qty: 20 TABLET | Refills: 0 | Status: SHIPPED | OUTPATIENT
Start: 2025-08-05

## 2025-08-05 RX ORDER — ONDANSETRON 4 MG/1
4 TABLET, ORALLY DISINTEGRATING ORAL EVERY 8 HOURS PRN
Qty: 20 TABLET | Refills: 0 | Status: SHIPPED | OUTPATIENT
Start: 2025-08-05

## 2025-08-05 RX ORDER — KETOROLAC TROMETHAMINE 30 MG/ML
15 INJECTION, SOLUTION INTRAMUSCULAR; INTRAVENOUS ONCE
Status: COMPLETED | OUTPATIENT
Start: 2025-08-05 | End: 2025-08-05

## 2025-08-05 RX ORDER — 0.9 % SODIUM CHLORIDE 0.9 %
500 INTRAVENOUS SOLUTION INTRAVENOUS ONCE
Status: COMPLETED | OUTPATIENT
Start: 2025-08-05 | End: 2025-08-05

## 2025-08-05 RX ORDER — ONDANSETRON 2 MG/ML
4 INJECTION INTRAMUSCULAR; INTRAVENOUS ONCE
Status: COMPLETED | OUTPATIENT
Start: 2025-08-05 | End: 2025-08-05

## 2025-08-05 RX ADMIN — SODIUM CHLORIDE 500 ML: 9 INJECTION, SOLUTION INTRAVENOUS at 20:47

## 2025-08-05 RX ADMIN — KETOROLAC TROMETHAMINE 15 MG: 30 INJECTION, SOLUTION INTRAMUSCULAR at 21:11

## 2025-08-05 RX ADMIN — ONDANSETRON 4 MG: 2 INJECTION, SOLUTION INTRAMUSCULAR; INTRAVENOUS at 21:11

## 2025-08-05 ASSESSMENT — LIFESTYLE VARIABLES
HOW OFTEN DO YOU HAVE A DRINK CONTAINING ALCOHOL: NEVER
HOW MANY STANDARD DRINKS CONTAINING ALCOHOL DO YOU HAVE ON A TYPICAL DAY: PATIENT DOES NOT DRINK

## (undated) DEVICE — PACK PROCEDURE SURG MAJ W/ BASIN LF

## (undated) DEVICE — SUT PROL 2-0 30IN CT1 BLU --

## (undated) DEVICE — REM POLYHESIVE ADULT PATIENT RETURN ELECTRODE: Brand: VALLEYLAB

## (undated) DEVICE — DRAPE TOWEL: Brand: CONVERTORS

## (undated) DEVICE — SUPPORT SCROT XL WHT COT ATH W/ LEG STRP ADJ E WAISTBAND

## (undated) DEVICE — SUTURE MCRYL SZ 4-0 L27IN ABSRB UD L24MM PS-1 3/8 CIR PRIM Y935H

## (undated) DEVICE — SUTURE PDS II SZ 2-0 L27IN ABSRB VLT L36MM CT-1 1/2 CIR Z339H

## (undated) DEVICE — PENROSE TUBING RADIOPAQUE: Brand: ARGYLE

## (undated) DEVICE — BLANKET WRM AD W50XL85.8IN PACU FULL BODY FORC AIR

## (undated) DEVICE — STERILE POLYISOPRENE POWDER-FREE SURGICAL GLOVES: Brand: PROTEXIS

## (undated) DEVICE — SUTURE ETHBND EXCEL SZ 0 L18IN NONABSORBABLE GRN L36MM CT-1 CX21D

## (undated) DEVICE — 3M™ STERI-STRIP™ REINFORCED ADHESIVE SKIN CLOSURES, R1549, 1/2 IN X 2 IN (12 MM X 50 MM), 6 STRIPS/ENVELOPE: Brand: 3M™ STERI-STRIP™

## (undated) DEVICE — GLOVE SURG SZ 7.5 L11.73IN FNGR THK9.8MIL STRW LTX POLYMER

## (undated) DEVICE — INTENDED FOR TISSUE SEPARATION, AND OTHER PROCEDURES THAT REQUIRE A SHARP SURGICAL BLADE TO PUNCTURE OR CUT.: Brand: BARD-PARKER SAFETY BLADES SIZE 15, STERILE

## (undated) DEVICE — KIT CLN UP BON SECOURS MARYV

## (undated) DEVICE — SPONGE DISSECT PNUT SM 3/8IN -- 5/PK

## (undated) DEVICE — SUTURE VCRL SZ 2-0 L36IN ABSRB UD L36MM CT-1 1/2 CIR J945H

## (undated) DEVICE — SOLUTION IV 1000ML 0.9% SOD CHL

## (undated) DEVICE — DERMABOND SKIN ADH 0.7ML -- DERMABOND ADVANCED 12/BX

## (undated) DEVICE — MASTISOL ADHESIVE LIQ 2/3ML

## (undated) DEVICE — SUTURE VCRL SZ 3-0 L27IN ABSRB UD L26MM SH 1/2 CIR J416H

## (undated) DEVICE — GARMENT,MEDLINE,DVT,INT,CALF,MED, GEN2: Brand: MEDLINE

## (undated) DEVICE — SUTURE VCRL SZ 3-0 L36IN ABSRB UD L36MM CT-1 1/2 CIR J944H